# Patient Record
Sex: MALE | Race: ASIAN
[De-identification: names, ages, dates, MRNs, and addresses within clinical notes are randomized per-mention and may not be internally consistent; named-entity substitution may affect disease eponyms.]

---

## 2020-08-12 ENCOUNTER — HOSPITAL ENCOUNTER (INPATIENT)
Dept: HOSPITAL 72 - SDSOVERFLO | Age: 40
LOS: 11 days | Discharge: HOME | DRG: 520 | End: 2020-08-23
Payer: COMMERCIAL

## 2020-08-12 VITALS — HEIGHT: 69 IN | WEIGHT: 135 LBS | BODY MASS INDEX: 19.99 KG/M2

## 2020-08-12 DIAGNOSIS — M48.07: ICD-10-CM

## 2020-08-12 DIAGNOSIS — M51.17: ICD-10-CM

## 2020-08-12 DIAGNOSIS — M48.061: ICD-10-CM

## 2020-08-12 DIAGNOSIS — M51.16: Primary | ICD-10-CM

## 2020-08-12 DIAGNOSIS — D64.9: ICD-10-CM

## 2020-08-12 PROCEDURE — 72020 X-RAY EXAM OF SPINE 1 VIEW: CPT

## 2020-08-12 PROCEDURE — 85025 COMPLETE CBC W/AUTO DIFF WBC: CPT

## 2020-08-12 PROCEDURE — 86901 BLOOD TYPING SEROLOGIC RH(D): CPT

## 2020-08-12 PROCEDURE — 87081 CULTURE SCREEN ONLY: CPT

## 2020-08-12 PROCEDURE — 82962 GLUCOSE BLOOD TEST: CPT

## 2020-08-12 PROCEDURE — 86850 RBC ANTIBODY SCREEN: CPT

## 2020-08-12 PROCEDURE — 82248 BILIRUBIN DIRECT: CPT

## 2020-08-12 PROCEDURE — 94150 VITAL CAPACITY TEST: CPT

## 2020-08-12 PROCEDURE — 85007 BL SMEAR W/DIFF WBC COUNT: CPT

## 2020-08-12 PROCEDURE — 86900 BLOOD TYPING SEROLOGIC ABO: CPT

## 2020-08-12 PROCEDURE — 36415 COLL VENOUS BLD VENIPUNCTURE: CPT

## 2020-08-12 PROCEDURE — 80053 COMPREHEN METABOLIC PANEL: CPT

## 2020-08-12 PROCEDURE — 94003 VENT MGMT INPAT SUBQ DAY: CPT

## 2020-08-12 PROCEDURE — 76000 FLUOROSCOPY <1 HR PHYS/QHP: CPT

## 2020-08-21 VITALS — SYSTOLIC BLOOD PRESSURE: 126 MMHG | DIASTOLIC BLOOD PRESSURE: 78 MMHG

## 2020-08-21 VITALS — DIASTOLIC BLOOD PRESSURE: 82 MMHG | SYSTOLIC BLOOD PRESSURE: 122 MMHG

## 2020-08-21 VITALS — DIASTOLIC BLOOD PRESSURE: 71 MMHG | SYSTOLIC BLOOD PRESSURE: 110 MMHG

## 2020-08-21 VITALS — SYSTOLIC BLOOD PRESSURE: 110 MMHG | DIASTOLIC BLOOD PRESSURE: 67 MMHG

## 2020-08-21 VITALS — SYSTOLIC BLOOD PRESSURE: 120 MMHG | DIASTOLIC BLOOD PRESSURE: 74 MMHG

## 2020-08-21 VITALS — DIASTOLIC BLOOD PRESSURE: 88 MMHG | SYSTOLIC BLOOD PRESSURE: 130 MMHG

## 2020-08-21 VITALS — SYSTOLIC BLOOD PRESSURE: 148 MMHG | DIASTOLIC BLOOD PRESSURE: 52 MMHG

## 2020-08-21 VITALS — SYSTOLIC BLOOD PRESSURE: 125 MMHG | DIASTOLIC BLOOD PRESSURE: 77 MMHG

## 2020-08-21 VITALS — DIASTOLIC BLOOD PRESSURE: 72 MMHG | SYSTOLIC BLOOD PRESSURE: 121 MMHG

## 2020-08-21 VITALS — SYSTOLIC BLOOD PRESSURE: 92 MMHG | DIASTOLIC BLOOD PRESSURE: 56 MMHG

## 2020-08-21 VITALS — DIASTOLIC BLOOD PRESSURE: 69 MMHG | SYSTOLIC BLOOD PRESSURE: 116 MMHG

## 2020-08-21 VITALS — DIASTOLIC BLOOD PRESSURE: 78 MMHG | SYSTOLIC BLOOD PRESSURE: 139 MMHG

## 2020-08-21 VITALS — DIASTOLIC BLOOD PRESSURE: 86 MMHG | SYSTOLIC BLOOD PRESSURE: 164 MMHG

## 2020-08-21 VITALS — SYSTOLIC BLOOD PRESSURE: 118 MMHG | DIASTOLIC BLOOD PRESSURE: 73 MMHG

## 2020-08-21 VITALS — DIASTOLIC BLOOD PRESSURE: 73 MMHG | SYSTOLIC BLOOD PRESSURE: 112 MMHG

## 2020-08-21 VITALS — SYSTOLIC BLOOD PRESSURE: 143 MMHG | DIASTOLIC BLOOD PRESSURE: 92 MMHG

## 2020-08-21 VITALS — SYSTOLIC BLOOD PRESSURE: 132 MMHG | DIASTOLIC BLOOD PRESSURE: 85 MMHG

## 2020-08-21 VITALS — SYSTOLIC BLOOD PRESSURE: 130 MMHG | DIASTOLIC BLOOD PRESSURE: 81 MMHG

## 2020-08-21 VITALS — DIASTOLIC BLOOD PRESSURE: 73 MMHG | SYSTOLIC BLOOD PRESSURE: 121 MMHG

## 2020-08-21 LAB
ADD MANUAL DIFF: YES
ALBUMIN SERPL-MCNC: 3.8 G/DL (ref 3.4–5)
ALBUMIN/GLOB SERPL: 1.6 {RATIO} (ref 1–2.7)
ALP SERPL-CCNC: 46 U/L (ref 46–116)
ALT SERPL-CCNC: 17 U/L (ref 12–78)
ANION GAP SERPL CALC-SCNC: 9 MMOL/L (ref 5–15)
AST SERPL-CCNC: 13 U/L (ref 15–37)
BILIRUB DIRECT SERPL-MCNC: 0.3 MG/DL (ref 0–0.3)
BILIRUB SERPL-MCNC: 1.1 MG/DL (ref 0.2–1)
BUN SERPL-MCNC: 14 MG/DL (ref 7–18)
CALCIUM SERPL-MCNC: 8.4 MG/DL (ref 8.5–10.1)
CHLORIDE SERPL-SCNC: 107 MMOL/L (ref 98–107)
CO2 SERPL-SCNC: 25 MMOL/L (ref 21–32)
CREAT SERPL-MCNC: 1.3 MG/DL (ref 0.55–1.3)
ERYTHROCYTE [DISTWIDTH] IN BLOOD BY AUTOMATED COUNT: 14.2 % (ref 11.6–14.8)
GLOBULIN SER-MCNC: 2.4 G/DL
HCT VFR BLD CALC: 30.8 % (ref 42–52)
HGB BLD-MCNC: 9.4 G/DL (ref 14.2–18)
MCV RBC AUTO: 62 FL (ref 80–99)
PLATELET # BLD: 155 K/UL (ref 150–450)
POTASSIUM SERPL-SCNC: 4 MMOL/L (ref 3.5–5.1)
RBC # BLD AUTO: 4.94 M/UL (ref 4.7–6.1)
SODIUM SERPL-SCNC: 141 MMOL/L (ref 136–145)
WBC # BLD AUTO: 11.7 K/UL (ref 4.8–10.8)

## 2020-08-21 RX ADMIN — DOCUSATE SODIUM SCH MG: 100 CAPSULE, LIQUID FILLED ORAL at 18:05

## 2020-08-21 RX ADMIN — SODIUM CHLORIDE SCH MLS/HR: 0.9 INJECTION INTRAVENOUS at 18:05

## 2020-08-21 RX ADMIN — SODIUM CHLORIDE PRN MG: 0.9 INJECTION INTRAVENOUS at 17:39

## 2020-08-21 RX ADMIN — DIPHENHYDRAMINE HYDROCHLORIDE PRN MG: 50 INJECTION INTRAMUSCULAR; INTRAVENOUS at 22:05

## 2020-08-21 RX ADMIN — SODIUM CHLORIDE AND POTASSIUM CHLORIDE SCH MLS/HR: 9; 1.49 INJECTION, SOLUTION INTRAVENOUS at 18:05

## 2020-08-21 NOTE — ANETHESIA PREOPERATIVE EVAL
Anesthesia Pre-op PMH/ROS


General


Date of Evaluation:  Aug 21, 2020


Time of Evaluation:  09:29


Anesthesiologist:  Markos


ASA Score:  ASA 2


Mallampati Score


Class I : Soft palate, uvula, fauces, pillars visible


Class II: Soft palate, uvula, fauces visible


Class III: Soft palate, base of uvula visible


Class IV: Only hard plate visible


Mallampati Classification:  Class II


Surgeon:  Nathaniel


Diagnosis:  Back Pain


Surgical Procedure:  B Hemilaminotomy, Foraminotomy, L4-5, L5-S1, R 

Microdiscectomy L4-5, L5-S1


Anesthesia History:  none


Family History:  no anesthesia problems


Allergies:  


Coded Allergies:  


     No Known Allergies (Unverified , 20)


Medications:  see eMAR


Patient NPO?:  Yes





Past Medical History


Pulmonary:  Reports: asthma


Hematology/Immune:  Reports: anemia





Anesthesia Pre-op Phys. Exam


Physician Exam





Last Vital Signs








  Date Time  Temp Pulse Resp B/P (MAP) Pulse Ox O2 Delivery O2 Flow Rate FiO2


 


20 08:33      Room Air  


 


20 08:31 97.7 69 18 121/72 (88) 100   








Constitutional:  NAD


Neurologic:  CN 2-12 intact


Cardiovascular:  RRR


Respiratory:  CTA


Gastrointestinal:  S/NT/ND





Airway Exam


Mallampati Score:  Class II


MO:  full


ROM:  full


Teeth:  missing, intact





Anesthesia Pre-op A/P


Risk Assessment & Plan


Assessment:


ASA 2


Plan:


GA, SED, GlideScope


Status Change Before Surgery:  No





Pre-Antibiotics


Dru Grams Ancef IV


Given Within 1 Hr of Incision:  Yes


Time Given:  09:51











David Burrows MD Aug 21, 2020 09:07

## 2020-08-21 NOTE — DIAGNOSTIC IMAGING REPORT
INDICATION:  Pain, intraoperative

 

TECHNIQUE: Intraoperative imaging

 

Fluoroscopy time: 2.9 seconds

Total dose: 0.90214 mGym2

Total number of images: One

 

COMPARISON: None

 

FINDINGS: Intraoperative images document surgical tools projected posterior to the

superior L5 endplate and L5-S1 disc.

 

IMPRESSION: Intraoperative imaging, as described

## 2020-08-21 NOTE — BRIEF OPERATIVE NOTE
Immediate Post Operative Note


Operative Note


Chief Complaint:  back pain and radiculopathy


Pre-op Diagnosis:


L45 and L5S1 herniation


Procedure:


Bilateral hemilaminotomy foraminotomy and right sided microdiscectomy of lumbar 

45 and 5-S1


Post-op Diagnosis:  same as pre-op


Findings:  consistent w/pre-op dx studies


Surgeon:  Nathaniel


Assistant:  Casey


Anesthesiologist:  Italo


Anesthesia:  general


Specimen:  none


Complications:  none


Condition:  stable


Fluids:  IVF


Estimated Blood Loss:  minimal


Drains:  none


Implant(s) used?:  No











Ray rAmstrong MD Aug 21, 2020 07:33

## 2020-08-21 NOTE — NUR
CASE MANAGEMENT: INITIAL REVIEW 



38 YO M PRESENTED TO OUR [facility]OS[facility] FOR SURGERY 



PMHx; ASTHMA 



SI;S/P Bilateral hemilaminotomy foraminotomy and right sided microdiscectomy of lumbar 45 
and 5-S1

VS: T 97.7 HR 69 RR 18 B/P 121/72 SATS 100% ON RA 

LABS: WBC 11.7  CA 8.4 TBILI 1.1 AST 13 



IS:OR MEDS'





** PATIENT ADMITTED TO MED/SURG 8/21/2020 @ 0733****



DCP: HOME 



PLAN OF CARE: 

1. incentive spirometry

2. SCD

3. PT evaluation and therapy

4. Hydration

5. Pain management

6. Monitor LOC

7. discharge once stable with outpatient follow up





Operative Note

Chief Complaint:  back pain and radiculopathy

Pre-op Diagnosis:

L45 and L5S1 herniation

Procedure:

Bilateral hemilaminotomy foraminotomy and right sided microdiscectomy of lumbar 45 and 5-S1

Post-op Diagnosis:  same as pre-op

## 2020-08-21 NOTE — 48 HOUR POST ANESTHESIA EVAL
Post Anesthesia Evaluation


Procedure:  B Hemilaminotomy, Foraminotomy, L4-5, L5-S1, R Microdiscectomy L4-5

, L5-S1


Date of Evaluation:  Aug 21, 2020


Time of Evaluation:  17:23


Blood Pressure Systolic:  132


0:  74


Pulse Rate:  71


Respiratory Rate:  18


Temperature (Fahrenheit):  98.6


O2 Sat by Pulse Oximetry:  100


Airway:  patent


Nausea:  No


Vomiting:  No


Pain Intensity:  2


Hydration Status:  adequate


Cardiopulmonary Status:


Stable


Mental Status/LOC:  patient returned to baseline


Follow-up Care/Observations:


0


Post-Anesthesia Complications:


0











David Burrows MD Aug 21, 2020 09:34

## 2020-08-21 NOTE — GENERAL PROGRESS NOTE
Assessment/Plan


Assessment/Plan:


L45 and L5S1 herniation


Bilateral hemilaminotomy foraminotomy and right sided microdiscectomy of lumbar 

45 and 5-S1


paradoxical anesthesia reaction with toxic met encephalopathy, resolved





PLAN


1. incentive spirometry


2. SCD


3. PT evaluation and therapy


4. Hydration


5. Pain management


6. Monitor LOC


7. discharge once stable with outpatient follow up





d/w Dr. Armstrong





Subjective


Allergies:  


Coded Allergies:  


     No Known Allergies (Unverified , 8/20/20)


Subjective


asked to see in recovery 


was altered


vitals initially tachy 


improved when seen





poor response to pain


on follow up - now back to normal baseline





Objective





Last 24 Hour Vital Signs








  Date Time  Temp Pulse Resp B/P (MAP) Pulse Ox O2 Delivery O2 Flow Rate FiO2


 


8/21/20 14:55  71 18  100   


 


8/21/20 14:54  134 16  100   


 


8/21/20 14:50 99.3 128 28 164/84 100 Simple Mask 6 


 


8/21/20 08:33      Room Air  


 


8/21/20 08:31 97.7 69 18 121/72 (88) 100   








Laboratory Tests


8/21/20 15:50: POC Whole Blood Glucose 183H


8/21/20 16:05: 


White Blood Count 11.7H, Red Blood Count 4.94, Hemoglobin 9.4L, Hematocrit 30.8L

, Mean Corpuscular Volume 62L, Mean Corpuscular Hemoglobin 19.1L, Mean 

Corpuscular Hemoglobin Concent 30.6L, Red Cell Distribution Width 14.2, 

Platelet Count 155, Mean Platelet Volume 9.3, Neutrophils (%) (Auto) , 

Lymphocytes (%) (Auto) , Monocytes (%) (Auto) , Eosinophils (%) (Auto) , 

Basophils (%) (Auto) , Differential Total Cells Counted 100, Neutrophils % (

Manual) 88H, Lymphocytes % (Manual) 3L, Monocytes % (Manual) 8, Eosinophils % (

Manual) 1, Basophils % (Manual) 0, Band Neutrophils 0, Platelet Estimate 

DecreasedL, Platelet Morphology Normal, Polychromasia 1+, Hypochromasia 1+, 

Anisocytosis 1+, Sodium Level 141, Potassium Level 4.0, Chloride Level 107, 

Carbon Dioxide Level 25, Anion Gap 9, Blood Urea Nitrogen 14, Creatinine 1.3, 

Estimat Glomerular Filtration Rate > 60, Glucose Level 182H, Calcium Level 8.4L

, Total Bilirubin 1.1H, Direct Bilirubin 0.3, Aspartate Amino Transf (AST/SGOT) 

13L, Alanine Aminotransferase (ALT/SGPT) 17, Alkaline Phosphatase 46, Total 

Protein 6.2L, Albumin 3.8, Globulin 2.4, Albumin/Globulin Ratio 1.6


Height (Feet):  5


Height (Inches):  9.00


Weight (Pounds):  135


Objective


WDWN


NAD


clear breath sounds bilaterally without rhonchi or wheeze


[id number] without MRG


NABS nontender no HSM


no CCE


nonfocal











Foster Delacruz MD Aug 21, 2020 17:27

## 2020-08-21 NOTE — NUR
NURSE HAND-OFF: 



Important Events on Shift:[s/p surgery]

Patient Status: stable

Diet: regular



Pending Orders: n/a

Pending Results/Labs:n/a

Pending MD notification:n/a



Latest Vital Signs: Temperature 98.1 , Pulse 89 , B/P 118 /73 , Respiratory Rate 18 , O2 SAT 
95 , Room Air, O2 Flow Rate  .  

Vital Sign Comment: n/a



Latest Mae Fall Score: 35  

Fall Risk: Medium Risk 

Safety Measures: Call light Within Reach, Side Rails Side Rails x1, Bed position Low and 
Locked.

Fall Precautions: 

Patient Fall Education



Report given to Al RN.

## 2020-08-21 NOTE — NUR
NURSE NOTES:

Patient arrived on unit via hospital bed. Stable. AOx2. Patient needed to be reminded of 
where he is and what day it is. Patient denies pain or SOB. Breathing is even and unlabored. 
VSS. Patient oriented to room, call light, and unit. Patient instructed to use call light 
for assistance, verbalized understanding. Patient is in bed in locked and lowest position 
with call light within reach. All safety measures provided. SCD on jenni legs. IV flushed and 
patent. Surgical dressing c/d/i. Ice pack in place. All needs met at this time. Will 
continue to monitor.

## 2020-08-21 NOTE — GENERAL SURGERY PROGRESS NOTE
General Surgery-Progress Note


Subjective


Day of Surgery:  8/21/2020


Reason for Consult


Postoperative State


Procedure Performed


Bilateral hemilaminotomy foraminotomy and right sided microdiscectomy of lumbar 

45 and 5-S1


Chief Complaint:  Non responsive to commands.


Additional Comments


Patient is not responding to all commands as he is awakening from anesthesia, 

yet moves all extremities





Objective





Last 24 Hour Vital Signs








  Date Time  Temp Pulse Resp B/P (MAP) Pulse Ox O2 Delivery O2 Flow Rate FiO2


 


8/21/20 14:55  71 18  100   


 


8/21/20 14:54  134 16  100   


 


8/21/20 14:50 99.3 128 28 164/84 100 Simple Mask 6 


 


8/21/20 08:33      Room Air  


 


8/21/20 08:31 97.7 69 18 121/72 (88) 100   








Dressing:  dry


Wound:  dry


Drains:  none


Cardiovascular:  RSR


Respiratory:  clear


Abdomen:  soft, non-tender


Extremities:  no edema





Laboratory Tests








Test


  8/21/20


15:50


 


POC Whole Blood Glucose


  183 MG/DL


()  H








Imaging


MRI Brain, Ct Head





Assessment


Post-op Diagnosis


Status post hemilaminotomy microdiscectomy L45 L5S1





Plan


Additional Comments


Workup in the PACU for his current state


I was notified at 3:27PM that Mr Aguirre was having difficulty waking from the 

surgical anesthesia


I was in the hospital and was at his bedside within moments


I immediately called for Dr. Burrows and Dr Rose to help evaluate our shared 

patient


I ordered a Ct Head STAT , MRI Brain, Neurology consult with Dr Aston Aguiar


CBC , Chem12


Transfer to ICU for monitoring in the interim











Ray Armstrong MD Aug 21, 2020 16:09

## 2020-08-21 NOTE — PRE-PROCEDURE NOTE/ATTESTATION
Pre-Procedure Note/Attestation


Complete Prior to Procedure


Planned Procedure:  bilateral


Procedure Narrative:


Bilateral hemilaminotomy foraminotomy and right sided microdiscectomy of lumbar 

45 and 5-S1





Indications for Procedure


Pre-Operative Diagnosis:


L45 and L5S1 herniation





Attestation


I attest that I discussed the nature of the procedure; its benefits; risks and 

complications; and alternatives (and the risks and benefits of such alternatives

), prior to the procedure, with the patient (or the patient's legal 

representative).





I attest that, if there was a reasonable possibility of needing a blood 

transfusion, the patient (or the patient's legal representative) was given the 

[place] [employer] standardized written summary, pursuant 

to the [place] Blood Safety Act ([place] Health and Safety Code # 1645, as 

amended).





I attest that I re-evaluated the patient just prior to the surgery and that 

there has been no change in the patient's H&P, except as documented below:











Ray Armstrong MD Aug 21, 2020 07:32

## 2020-08-21 NOTE — NUR
NURSE NOTES:

Received awake flat in bed, alert and oriented, verbally responsive. No sob noted, breathing 
even and unlabored, with complaints of pain, 6/10, was just given pain meds. With bed in 
it's lowest position, alarmed and locked. With post op site dressing dry and intact. Will 
continue to monitor.

## 2020-08-21 NOTE — IMMEDIATE POST-OP EVALUATION
Immediate Post-Op Evalulation


Immediate Post-Op Evalulation


Procedure:  B Hemilaminotomy, Foraminotomy, L4-5, L5-S1, R Microdiscectomy L4-5

, L5-S1


Date of Evaluation:  Aug 21, 2020


Time of Evaluation:  15:05


IV Fluids:  1700 LR


Blood Products:  0


Estimated Blood Loss:  100


Urinary Output:  1100


Blood Pressure Systolic:  144


Blood Pressure Diastolic:  88


Pulse Rate:  134


Respiratory Rate:  16


O2 Sat by Pulse Oximetry:  100


Temperature (Fahrenheit):  99.3


Pain Score (1-10):  2


Nausea:  No


Vomiting:  No


Complications


0


Patient Status:  awake, reacts, patent, extubated, none


Hydration Status:  adequate


Dru Grams Ancef IV


Given Within 1 Hr of Incision:  Yes


Time Given:  09:51











David Burrows MD Aug 21, 2020 09:33

## 2020-08-21 NOTE — GENERAL SURGERY PROGRESS NOTE
General Surgery-Progress Note


Subjective


Day of Surgery:  8/21/20


Reason for Consult


Patient awoke from surgery 430pm , he is responsive to person place and time, 

able to answer all questions normally


Procedure Performed


Bilateral hemilaminotomy foraminotomy and right sided microdiscectomy of lumbar 

45 and 5-S1


Chief Complaint:  Patient was nonresponsive postoperative until he awoke from 

anesthesia 430-


Symptoms:  improved


Additional Comments


I was at the bedside about to place a lockett when he awoke from surgery





Objective





Last 24 Hour Vital Signs








  Date Time  Temp Pulse Resp B/P (MAP) Pulse Ox O2 Delivery O2 Flow Rate FiO2


 


8/21/20 14:55  71 18  100   


 


8/21/20 14:54  134 16  100   


 


8/21/20 14:50 99.3 128 28 164/84 100 Simple Mask 6 


 


8/21/20 08:33      Room Air  


 


8/21/20 08:31 97.7 69 18 121/72 (88) 100   








Dressing:  dry


Wound:  clean, intact


Drains:  none


Cardiovascular:  RSR


Respiratory:  clear


Abdomen:  soft


Extremities:  no edema





Laboratory Tests








Test


  8/21/20


15:50 8/21/20


16:05


 


POC Whole Blood Glucose


  183 MG/DL


()  H 


 


 


White Blood Count


  


  11.7 K/UL


(4.8-10.8)  H


 


Red Blood Count


  


  4.94 M/UL


(4.70-6.10)


 


Hemoglobin


  


  9.4 G/DL


(14.2-18.0)  L


 


Hematocrit


  


  30.8 %


(42.0-52.0)  L


 


Mean Corpuscular Volume


  


  62 FL (80-99)


L


 


Mean Corpuscular Hemoglobin


  


  19.1 PG


(27.0-31.0)  L


 


Mean Corpuscular Hemoglobin


Concent 


  30.6 G/DL


(32.0-36.0)  L


 


Red Cell Distribution Width


  


  14.2 %


(11.6-14.8)


 


Platelet Count


  


  155 K/UL


(150-450)


 


Mean Platelet Volume


  


  9.3 FL


(6.5-10.1)


 


Neutrophils (%) (Auto)


  


  % (45.0-75.0)


 


 


Lymphocytes (%) (Auto)


  


  % (20.0-45.0)


 


 


Monocytes (%) (Auto)   % (1.0-10.0)  


 


Eosinophils (%) (Auto)   % (0.0-3.0)  


 


Basophils (%) (Auto)   % (0.0-2.0)  


 


Neutrophils % (Manual)  Pending  


 


Lymphocytes % (Manual)  Pending  


 


Platelet Estimate  Pending  


 


Platelet Morphology  Pending  


 


Sodium Level  Pending  


 


Potassium Level  Pending  


 


Chloride Level  Pending  


 


Carbon Dioxide Level  Pending  


 


Blood Urea Nitrogen  Pending  


 


Creatinine  Pending  


 


Estimat Glomerular Filtration


Rate 


  Pending  


 


 


Glucose Level  Pending  


 


Calcium Level  Pending  


 


Total Bilirubin  Pending  


 


Aspartate Amino Transf


(AST/SGOT) 


  Pending  


 


 


Alanine Aminotransferase


(ALT/SGPT) 


  Pending  


 


 


Alkaline Phosphatase  Pending  


 


Total Protein  Pending  


 


Albumin  Pending  


 


Globulin  Pending  











Assessment


Post-op Diagnosis


Status post hemilaminotomy microdiscectomy L45 L5S1





Plan


Additional Comments


Patient awoke and is able to answer all questions to person place and time


he was able to answer who the president was, the year , the month the city and 

his function here


He is now completely responsive and as a result we can hold on cT MRI and 

neurology consultation


We will follow him closely











Ray Armstrong MD Aug 21, 2020 16:33

## 2020-08-22 VITALS — DIASTOLIC BLOOD PRESSURE: 70 MMHG | SYSTOLIC BLOOD PRESSURE: 119 MMHG

## 2020-08-22 VITALS — DIASTOLIC BLOOD PRESSURE: 53 MMHG | SYSTOLIC BLOOD PRESSURE: 96 MMHG

## 2020-08-22 VITALS — SYSTOLIC BLOOD PRESSURE: 127 MMHG | DIASTOLIC BLOOD PRESSURE: 73 MMHG

## 2020-08-22 VITALS — DIASTOLIC BLOOD PRESSURE: 56 MMHG | SYSTOLIC BLOOD PRESSURE: 105 MMHG

## 2020-08-22 VITALS — SYSTOLIC BLOOD PRESSURE: 100 MMHG | DIASTOLIC BLOOD PRESSURE: 66 MMHG

## 2020-08-22 VITALS — DIASTOLIC BLOOD PRESSURE: 65 MMHG | SYSTOLIC BLOOD PRESSURE: 123 MMHG

## 2020-08-22 VITALS — SYSTOLIC BLOOD PRESSURE: 99 MMHG | DIASTOLIC BLOOD PRESSURE: 51 MMHG

## 2020-08-22 RX ADMIN — DIPHENHYDRAMINE HYDROCHLORIDE PRN MG: 50 INJECTION INTRAMUSCULAR; INTRAVENOUS at 10:04

## 2020-08-22 RX ADMIN — HYDROCODONE BITARTRATE AND ACETAMINOPHEN PRN TAB: 7.5; 325 TABLET ORAL at 04:04

## 2020-08-22 RX ADMIN — SODIUM CHLORIDE PRN MG: 0.9 INJECTION INTRAVENOUS at 12:12

## 2020-08-22 RX ADMIN — SODIUM CHLORIDE SCH MLS/HR: 0.9 INJECTION INTRAVENOUS at 02:26

## 2020-08-22 RX ADMIN — DOCUSATE SODIUM SCH MG: 100 CAPSULE, LIQUID FILLED ORAL at 08:48

## 2020-08-22 RX ADMIN — SODIUM CHLORIDE AND POTASSIUM CHLORIDE SCH MLS/HR: 9; 1.49 INJECTION, SOLUTION INTRAVENOUS at 04:00

## 2020-08-22 RX ADMIN — HYDROCODONE BITARTRATE AND ACETAMINOPHEN PRN TAB: 7.5; 325 TABLET ORAL at 17:17

## 2020-08-22 RX ADMIN — SODIUM CHLORIDE AND POTASSIUM CHLORIDE SCH MLS/HR: 9; 1.49 INJECTION, SOLUTION INTRAVENOUS at 23:08

## 2020-08-22 RX ADMIN — SODIUM CHLORIDE PRN MG: 0.9 INJECTION INTRAVENOUS at 00:35

## 2020-08-22 RX ADMIN — SODIUM CHLORIDE SCH MLS/HR: 0.9 INJECTION INTRAVENOUS at 09:00

## 2020-08-22 RX ADMIN — HYDROCODONE BITARTRATE AND ACETAMINOPHEN PRN TAB: 7.5; 325 TABLET ORAL at 23:08

## 2020-08-22 RX ADMIN — SODIUM CHLORIDE AND POTASSIUM CHLORIDE SCH MLS/HR: 9; 1.49 INJECTION, SOLUTION INTRAVENOUS at 15:45

## 2020-08-22 RX ADMIN — DOCUSATE SODIUM SCH MG: 100 CAPSULE, LIQUID FILLED ORAL at 17:17

## 2020-08-22 NOTE — OPERATIVE NOTE - DICTATED
DATE OF OPERATION:  08/21/2020

SURGEON:  Ray Armstrong MD



ASSISTANT:  CASS Perry.



ANESTHESIA:  General endotracheal anesthesia.



PREOPERATIVE DIAGNOSES:

1. Intractable back pain.

2. Intractable leg pain.

3. Worsening radiculopathy.

4. Weakness.

5. Herniated nucleus pulposus, L4-L5, L5-S1 herniation.

6. Neural foraminal stenosis, L4-L5, L5-S1.



POSTOPERATIVE DIAGNOSES:

1. Intractable back pain.

2. Intractable leg pain.

3. Worsening radiculopathy.

4. Weakness.

5. Herniated nucleus pulposus, L4-L5, L5-S1 herniation.

6. Neural foraminal stenosis, L4-L5, L5-S1.



PROCEDURES PERFORMED:

1. Right-sided L4-L5, L5-S1 microdiscectomy.

2. L4-L5, L5-S1 hemilaminotomy, foraminotomy and medial facetectomy.

3. L4-L5, L5-S1 neural foraminotomy through a transpedicular

intraforaminal approach.

4. Use of intraoperative microscope.

5. Supervision and interpretation of intraoperative fluoroscopy.

6. Supervision and interpretation of somatosensory-evoked potential and

free running EMG monitoring.

7. Bilateral hemilaminotomy, foraminotomy L5-S1 and L4-L5.

8. Duraplasty L4-L5.



EBL:  Less than 100 mL.



COMPLICATIONS:  None.



INDICATIONS FOR THE PROCEDURE:  The patient presents for intractable back

pain and radiculopathy.  The patient tried and failed a prolonged course

of conservative management, including but not limited to chiropractic

therapy, physical therapy, nonsteroidal anti-inflammatory drugs,

medication, ice packs as well as epidural injection. Despite these

therapies, the patient still developed recalcitrant pain and elected for

definitive management in the form of right-sided L4-L5, L5-S1

microdiscectomy, L4-L5, L5-S1 hemilaminotomy, foraminotomy and medial

facetectomy, L4-L5, L5-S1 neural foraminotomy through a transpedicular

intraforaminal approach



CONSENT:  We had a long discussion with the patient regarding definitive

surgical treatment options.  The patient's MRI demonstrated herniated

nucleus pulposus, L4-L5, L5-S1 herniation, neural foraminal stenosis,

L4-L5, L5-S1 and as a result, I felt the patient would benefit from the

discectomy as well as neural foraminotomy at this level. We had a long

discussion with the patient regarding the risks, alternatives, and

benefits of surgery.  Our description of the risks included a discussion

in person as well as a signed consent which detailed all pertinent risks

and the procedure itself. Briefly, our discussion included but was not

limited to infection, bleeding, pseudarthrosis, spinal cord injury,

neurovascular injury, dural tear, CSF leak, neuropathy, paralysis,

permanent weakness/drop foot, paresthesias blindness, palsy and weakness.

The patient understood there may be a need for revision surgery or

additional procedures. Approach related complications including dysphonia,

dysphagia, blindness, permanent vocal cord and neural injury, hematoma,

swallowing and breathing difficulty. Medical complications including

liver, kidney, shock, and cardiopulmonary failure. Anesthesia

complications including death, swelling. Damage to the musculature, larynx

(voice injury or loss),esophagus (throat), trachea, blood vessels and

muscles (muscular sprain) and lungs (pneumothorax) during this surgical

procedure. Injury to deeper structures may be temporary or permanent. The

patient understood these and elected to proceed. A written and verbal

consent was given.



We discussed the pros and cons of all the alternatives.  We discussed

the uncertainties associated with the decision. Afterwards I assessed the

patients understanding and explored their preferences. All questions were

answered and no guarantees were given. Medical clearance was obtained

prior to surgery



INTRAOPERATIVE FINDINGS:  L4-L5 level:  There was a larger than anticipated

disc herniation at L4-L5 on the right side encroaching the right neural

foramina.  This was larger than anticipated when viewed on the MRI.  This

disc was encroaching on neural foramina on the right side.  A

hemilaminotomy was performed and noticed a bulging at the neural element

posteriorly.  The neural element was carefully mobilized and noticed a

protrusion posteriorly.  The neural element was due to a lateralized disc

herniation of herniated nucleus pulposus tissue.  This was probed with a

Microsect-1B curette and found to be the source of a tear in annular

fibrosis along the right aspect of the TLL.  This tear was approximately

inline with the annular fiber vertically almost 10 degrees cephalad to

caudad.  Through this ________ tissue encroaching on the neural foramina

as described on the right side.  This tissue was carefully dissected with

a combination of a Moise pituitary, arthroscopic pituitaries, and 1.5 and

2 mm narrow pituitaries until a complete microdiskectomy was performed.

Afterwards, I noticed there was no longer any protrusion of posterior

directed migration of the neural elements at this level.



At L4-L5, I noticed a calcification on the posterior margin of the

ligamentum flavum onto the dural tissue itself as a part of the

microdiskectomy and the decompression, the ligamentum flavum was resected

and upon removal of the bony island _______ attached to the dural tissue,

the decision was made to carefully peel off the dural tissue and for not

leaving it in its place.  As a result, this necessitated a duraplasty for

a 1 to 2 mm defect.  This was performed with a TF-4 Nurolon suture.  This

allowed for a wider watertight closure, which was afterwards supplemented

with DuraGen and Tisseel.



L5-S1:  At L5-S1 after the bilateral hemilaminotomy and foraminotomies were

performed, I noted a slight posterior migration on the neural element.

Once these were carefully mobilized using a Penfield 4 and narrow nerve

root retractor, I noticed the source to be a posterior disc herniation,

which was visualized through a nearly vertical tear in the posterior

longitudinal ligament, which was along the right neural foramina

approximately one-third laterally.  This nuclear tissue was carefully

mobilized with a Microsect-1B and was carefully resected with a

combination of arthroscopic pituitaries, narrow pituitaries 1.5 and 2 mm,

and a Moise micropituitary.  There were no bony elements noted at

L5-S1.



I should note the disc at both L4-L5 and L5-S1 was soft and appeared to

be in acute traumatic disc herniation.  There was nothing degenerative

noted about the disc.  The disc was not crumbled or desiccated or collapse

in disc height.



DESCRIPTION OF PROCEDURE:  Under the benefit of general endotracheal

anesthesia and with the assistance of the entire operative team, the

patient was moved from the r[place] onto the operative table in the prone

position on a [place] frame. The head was secured and positioned

appropriately. Bilateral arms were secured with Gel pads and foam and all

bony prominences were padded. The bilateral lower extremity SCD and DAE

hose were placed for DVT prophylaxis. A surgical timeout was called which

corroborated our planned procedure. Preoperative Antibiotics were

administered within 30 minutes of the incision for prophylaxis. Decadron

was given for preoperative steroids.



Using lateral radiography, the operative levels were delineated. An

incision was marked based on our interpretation of lateral radiography and

afterwards the body was prepped and draped in the usual sterile manner.

The family was notified that we were ready to commence surgery and were

called in the waiting room hourly for updates.



An incision was based on our lateral fluoroscopic image to center the

incision at the L5-S1 interspace. The wound was prepped and draped in the

usual sterile fashion. Using a scalpel a midline incision was taken down

through the skin and subcutaneous tissues until the overlying hemilamina

of L4-L5, L5-S1 were visualized.  Next, using meticulous hemostasis,

hemilaminotomies were dissected and retractors were placed.  Using a

Atzip dental, we confirmed placement at the L4-L5, L5-S1 interspace. We

next turned our attention to our decompression.



A standard hemilaminotomy foraminotomy medial facetectomy was performed

at each level in standard fashion using a Midas-Agustín type AM8 drill bit,

straight and angled curettage, and Kerrison 4 rongeurs until the lateral

thecal sac margin and traversing nerve root was visualized. All remainders

of the ligamentum flavum and lateral bony margins were resected in total

with angled curettage and Kerrison 4 rongeurs until the lateral thecal sac

margin and traversing nerve root was visualized and decompressed.



We next turned our attention toward our L4-L5, L5-S1 microdiscectomy on

the right side. A Penfield 4 was used to gently mobilize the thecal sac

medially and this was held retracted with a bayonetted nerve root

retractor. It was at this point that we noted a large broad-based disc

protrusion with encroachment dorsally on the thecal sac neural foraminal

contents. A bayonet and nerve root retractor was then placed carefully to

retract the thecal sac and a discectomy was performed using a combination

of a long handled 15 blade scalpel, downgoing and straight pituitaries and

downgoing curettage. Afterward the disc space was irrigated twice with 20

mL of antibiotic-impregnated saline. All loose and free-floating disc

fragments were carefully resected with a narrow pituitary.



Having been satisfied with our decompression after our discectomy of all

neural elements, we next turned our attention to our neural

foraminoplasty/foraminotomy. This was performed through a transpedicular

intraforaminal approach using an access probe followed by a neuro check

device, which confirmed ventral placement of our nerve root. Once we

confirmed we were safe, we next turned our attention towards placement of

our size 10 file under direct microscopic visualization and under lateral

fluoroscopy. Using pre and post reciprocation imaging, we were able to

visualize our direct decompression given the reciprocation allowed for

re-creation of the neural foraminal arch at L4-L5, L5-S1.  Afterwards,

hemostasis was obtained with 60 mL of antibiotic-impregnated saline

followed by FloSeal and Gelfoam.



After sponge and needle count were found to be correct, we next turned

our attention to closure. Closure consisted of 1-0 Vicryl in standard

interrupted fashion. Zosyn was placed deep to the fascia and superficial

to the fascia for antibiotic prophylaxis. Skin closure was performed with

2-0 Vicryl in interrupted fashion followed by a running Monocryl for the

skin. Final dressings consisted of Dermabond for the superficial skin,

Telfa and Tegaderm.



The patient tolerated the procedure well. The patient was extubated

after the conclusion of surgery without incident.  We discussed the

findings of the surgery with the family upon completion of the case.  At

this point the patient will be transferred to the spine floor for further

observation.









  ______________________________________________

  Ray Armstrong M.D.





DR:  ARIELLA

D:  08/21/2020 22:52

T:  08/22/2020 00:56

JOB#:  [id number]

CC:

## 2020-08-22 NOTE — NUR
NURSE HAND-OFF: 



Important Events on Shift:

Patient Status: stable

Diet: Regular



Pending Orders: 

Pending Results/Labs:

Pending MD notification:



Latest Vital Signs: Temperature 98.0 , Pulse 66 , B/P 100 /66 , Respiratory Rate 18 , O2 SAT 
98 , Room Air, O2 Flow Rate  .  

Vital Sign Comment: 



Latest Mae Fall Score: 35  

Fall Risk: Medium Risk 

Safety Measures: Call light Within Reach, Bed Alarm Zone 1, Side Rails Side Rails x2, Bed 
position Low and Locked.

Fall Precautions: 

Yellow Socks

Yellow Gown

Door Sign

Patient Fall Education



Report given to .

## 2020-08-22 NOTE — GENERAL PROGRESS NOTE
Assessment/Plan


Assessment/Plan:


L45 and L5S1 herniation


Bilateral hemilaminotomy foraminotomy and right sided microdiscectomy of lumbar 

45 and 5-S1


paradoxical anesthesia reaction with toxic met encephalopathy, resolved





PLAN


1. incentive spirometry


2. SCD


3. PT evaluation and therapy


4. Hydration


5. Pain management


6. Monitor LOC


7. discharge pending surgical and PT clearance





Subjective


Allergies:  


Coded Allergies:  


     No Known Allergies (Unverified , 8/20/20)


Subjective


doing well


at baseline


no focal findings





Objective





Last 24 Hour Vital Signs








  Date Time  Temp Pulse Resp B/P (MAP) Pulse Ox O2 Delivery O2 Flow Rate FiO2


 


8/22/20 04:34 97.8       


 


8/22/20 04:00 98.0 75 19 123/65 (84) 97   


 


8/22/20 02:00 97.1 78 19 119/70 (86) 99   


 


8/22/20 00:00 97.0 75 18 127/73 (91) 96   


 


8/21/20 22:40 97.5 81 19 112/73 (86) 100   


 


8/21/20 22:35 97.5       


 


8/21/20 21:40 97.6 83 19 110/71 (84) 99   


 


8/21/20 20:40 97.9 80 18 116/69 (85) 100   


 


8/21/20 20:11 98.1       


 


8/21/20 20:00      Room Air  


 


8/21/20 19:44  89  118/73 (88)    


 


8/21/20 18:10 98.1 93 18 92/56 (68) 95   


 


8/21/20 17:40 98.1 95 18 110/67 (81) 95   


 


8/21/20 17:10 98.1 97 20 125/77 (93) 96   


 


8/21/20 16:50 98.0 92 22 120/74 98 Room Air  


 


8/21/20 16:35  95 17 121/73 97 Room Air  


 


8/21/20 16:20  101 38 126/78 98 Room Air  


 


8/21/20 16:10  102 28 122/82 100 Room Air  


 


8/21/20 15:50  97 22 130/88 100 Simple Mask 6 


 


8/21/20 15:35  98 22 148/52 99 Simple Mask 6 


 


8/21/20 15:20  100 22 139/78 100 Simple Mask 6 


 


8/21/20 15:10  100 22 130/81 100 Simple Mask 6 


 


8/21/20 15:00  120 25 143/92 100 Simple Mask 6 


 


8/21/20 14:55  121 29 132/85 100 Simple Mask 6 


 


8/21/20 14:55  71 18  100   


 


8/21/20 14:54  134 16  100   


 


8/21/20 14:50 99.3 128 28 164/84 100 Simple Mask 6 


 


8/21/20 14:50  126 18 157/86 100 Simple Mask 6 

















Intake and Output  


 


 8/21/20 8/22/20





 19:00 07:00


 


Intake Total 2300 ml 955 ml


 


Output Total 1200 ml 


 


Balance 1100 ml 955 ml


 


  


 


Intake Oral 0 ml 


 


IV Total 2300 ml 955 ml


 


Output Urine Total 1100 ml 


 


Estimated Blood Loss 100 ml 


 


# Voids 1 








Laboratory Tests


8/21/20 15:50: POC Whole Blood Glucose 183H


8/21/20 16:05: 


White Blood Count 11.7H, Red Blood Count 4.94, Hemoglobin 9.4L, Hematocrit 30.8L

, Mean Corpuscular Volume 62L, Mean Corpuscular Hemoglobin 19.1L, Mean 

Corpuscular Hemoglobin Concent 30.6L, Red Cell Distribution Width 14.2, 

Platelet Count 155, Mean Platelet Volume 9.3, Neutrophils (%) (Auto) , 

Lymphocytes (%) (Auto) , Monocytes (%) (Auto) , Eosinophils (%) (Auto) , 

Basophils (%) (Auto) , Differential Total Cells Counted 100, Neutrophils % (

Manual) 88H, Lymphocytes % (Manual) 3L, Monocytes % (Manual) 8, Eosinophils % (

Manual) 1, Basophils % (Manual) 0, Band Neutrophils 0, Platelet Estimate 

DecreasedL, Platelet Morphology Normal, Polychromasia 1+, Hypochromasia 1+, 

Anisocytosis 1+, Sodium Level 141, Potassium Level 4.0, Chloride Level 107, 

Carbon Dioxide Level 25, Anion Gap 9, Blood Urea Nitrogen 14, Creatinine 1.3, 

Estimat Glomerular Filtration Rate > 60, Glucose Level 182H, Calcium Level 8.4L

, Total Bilirubin 1.1H, Direct Bilirubin 0.3, Aspartate Amino Transf (AST/SGOT) 

13L, Alanine Aminotransferase (ALT/SGPT) 17, Alkaline Phosphatase 46, Total 

Protein 6.2L, Albumin 3.8, Globulin 2.4, Albumin/Globulin Ratio 1.6


Height (Feet):  5


Height (Inches):  9.00


Weight (Pounds):  135


Objective


WDWN


NAD


clear breath sounds bilaterally without rhonchi or wheeze


[id number] without MRG


NABS nontender no HSM


no CCE


nonfocal


fully alert and oriented











Foster Delacruz MD Aug 22, 2020 08:54

## 2020-08-22 NOTE — NUR
CASE MANAGEMENT:REVIEW



8/22/20

SI: POD #1

97.5   74  18  99/51  97% ON RA



IS: IVF+KCL@100/HR

IV ANCEF Q8HRS

COLACE PO BID

IV REGLAN Q6HRS PRN

IV DILAUDID Q2HRS PRN

NORCO PO Q3HRS PRN

**: MED/SURG STATUS 3 EAST

DCP; FROM HOME

## 2020-08-22 NOTE — NUR
NURSE NOTES:

Received report from Alexandra RETANA.pt awake A/Ox4, breaths regular and  unlabored on RA,no s/s 
of distress , reports 5/10 pain but does not want pain medication at this time.  IVF running 
as ordered on the R hand 22 G, patent asymptomatic , bed in low locked position, side rails 
Up X2,  Call light within reach, will continue to monitor

## 2020-08-22 NOTE — NUR
Pt. was continuously monitored. With episodes of nausea 2x, prn meds given and noted 
effective. Had urinated 3x to a yellowish colored urine, free of sediments with a total of 
1300 cc. Complained of post op site pain, non radiating to any part of his body, prn meds 
given and noted with relief as per verbalized by patient. Slept @ short intervals. Log 
rolling done at all times. On continued ivatb therapy, no a/r noted. Pt. had some ice chips 
and tried to eat but becomes nauseated twice. All calls answered promptly. Remained alert 
and oriented x4 @ all times. No episodes of confusion or disorientation noted. Able to do 
his Incentive Spirometry when awake as witnessed. On continued scd therapy at all times. 
Will continue to monitor.

## 2020-08-22 NOTE — NUR
PT Note

PT severino completed, treatment initiated. Patient c/o 3/10 pain on his lumbar surgical 
incisions. Patient needs physical therapy to instruct/train on proper body mechanics/log 
rolling techniques and safety precautions for safe and independent functional mobility and 
gait to enable him to return to [facility]. 










-------------------------------------------------------------------------------

Addendum: 08/22/20 at 1416 by SEBASTIEN PHAN PT

-------------------------------------------------------------------------------

Amended: Links added.

## 2020-08-22 NOTE — NUR
NURSE NOTES:

Received report from Al RN, rounds made , pt awake A/Ox4, breaths regular unlabored on RA,no 
s/s of distress , pt remains stable s/p surgery , denies any pain at this time , pt able to 
wiggle toes, with strong bilateral pedal pulses , pt has bilateral SCD on , IVF on the R 
hand 22 G, patent asymptomatic , bed in low locked position, side rails Up X2, belongings 
and call light within reach, will continue to monitor

## 2020-08-23 VITALS — DIASTOLIC BLOOD PRESSURE: 58 MMHG | SYSTOLIC BLOOD PRESSURE: 100 MMHG

## 2020-08-23 VITALS — DIASTOLIC BLOOD PRESSURE: 61 MMHG | SYSTOLIC BLOOD PRESSURE: 103 MMHG

## 2020-08-23 VITALS — SYSTOLIC BLOOD PRESSURE: 117 MMHG | DIASTOLIC BLOOD PRESSURE: 67 MMHG

## 2020-08-23 VITALS — DIASTOLIC BLOOD PRESSURE: 62 MMHG | SYSTOLIC BLOOD PRESSURE: 111 MMHG

## 2020-08-23 RX ADMIN — HYDROCODONE BITARTRATE AND ACETAMINOPHEN PRN TAB: 7.5; 325 TABLET ORAL at 07:47

## 2020-08-23 RX ADMIN — HYDROCODONE BITARTRATE AND ACETAMINOPHEN PRN TAB: 7.5; 325 TABLET ORAL at 13:32

## 2020-08-23 RX ADMIN — DOCUSATE SODIUM SCH MG: 100 CAPSULE, LIQUID FILLED ORAL at 09:42

## 2020-08-23 RX ADMIN — SODIUM CHLORIDE AND POTASSIUM CHLORIDE SCH MLS/HR: 9; 1.49 INJECTION, SOLUTION INTRAVENOUS at 09:42

## 2020-08-23 NOTE — NUR
NURSE NOTES:

Patient discharged with family member in stable condition. Discharge instruction given to 
patient and verbalized understanding. Discharge prescription and belonging given to patient. 
IV an ID removed. Instructed to follow up with MD and verbalized understanding. Brought down 
to private car by wheelchair.

## 2020-08-23 NOTE — NUR
NURSE HAND-OFF: 



Important Events on Shift:

Patient Status: Stable

Diet: Regular diet



Pending Orders: 

Pending Results/Labs:

Pending MD notification:



Latest Vital Signs: Temperature 97.5 , Pulse 82 , B/P 100 /58 , Respiratory Rate 18 , O2 SAT 
98 , Room Air, O2 Flow Rate  .  

Vital Sign Comment: 



Latest Mae Fall Score: 35  

Fall Risk: Medium Risk 

Safety Measures: Call light Within Reach, Bed Alarm Zone 1, Side Rails Side Rails x2, Bed 
position Low and Locked.

Fall Precautions: 

Yellow Socks

Yellow Gown

Door Sign

Patient Fall Education



Report given to MAZIN Corbin.

## 2020-08-23 NOTE — NUR
NURSE NOTES:

Patient lying in bed awake. Complain of pain 7/10 on surgical site and will administer pain 
medication as ordered. IV dressing intact and dry. Surgical dressing intact and dry. Bed 
lowest position. Call light within reach. Will continue to monitor.

## 2020-08-24 NOTE — DISCHARGE SUMMARY
Discharge Summary


Hospital Course


Date of Admission


Aug 21, 2020 at 07:01


Date of Discharge


Aug 23, 2020 at 14:06


Admitting Diagnosis





 Intractable back and leg pain, Herniated nucleus pulposus L4-5, L5-S1, 

radiculopathy.


Reason for Hospitalization:  Elective surgery


HPI


Marcos Aguirre is a 39 year old male who was admitted on Aug 21, 2020 at 07:01 

for Herniated Nucleus Pulposus, Pain, Radiculopathy.


The patient tried and failed a prolonged course of conservative management, 

including but not limited to chiropractic


therapy, physical therapy, nonsteroidal anti-inflammatory drugs, medication, 

ice packs as well as epidural injection. 


Despite these therapies, the patient still developed recalcitrant pain and 

elected for definitive management in the form 


of right-sided L4-L5, [id number], L4-L5, L5-S1 hemilaminotomy, 

foraminotomy and medial facetectomy, L4-L5, 


L5-S1 neural foraminotomy through a transpedicular intraforaminal approach. 


Patient  consented for surgery .


Procedures





s/p 8/21/20 by Dr Armstrong


1. Right-sided L4-L5, L5-S1 microdiscectomy.


2. L4-L5, L5-S1 hemilaminotomy, foraminotomy and medial facetectomy.


3. L4-L5, L5-S1 neural foraminotomy through a transpedicular


intraforaminal approach.


4. Use of intraoperative microscope.


5. Supervision and interpretation of intraoperative fluoroscopy.


6. Supervision and interpretation of somatosensory-evoked potential and


free running EMG monitoring.


7. Bilateral hemilaminotomy, foraminotomy L5-S1 and L4-L5.


8. Duraplasty L4-L5.


Hospital Course


status post surgery 


 


initially IV fluids


s/p perioperative antibiotics


neurovascular status  closely monitored,   remained stable 


incision clean ,dry and intact 


pain management  was addressed  and controlled 


remained hemodynamically stable 


ambulated with PT  


fall precautions maintained;  safe for ambulation 


DVT prophylaxis provided 


use of incentive spirometry was  encouraged while in the bed 


tolerated diet , IV fluids discontinued 


GI prophylaxis provided 


antiemetics were on board as needed 


voided freely


bowel regimen instituted 


patient was stable for discharge 


discharge instructions provided 


follow up with surgeon in the office as advised 








FINAL DIAGNOSES


1. Intractable back pain.


2. Intractable leg pain.


3. Worsening radiculopathy.


4. Weakness.


5. Herniated nucleus pulposus, L4-L5, L5-S1 herniation.


6. Neural foraminal stenosis, L4-L5, L5-S1.


7. s/p Bilateral hemilaminotomy foraminotomy and right sided microdiscectomy of 

lumbar 45 and 5-S1





Discharge Medications


Continued Medications:  


Hydrocodone Bit/Acetaminophen * (Norco *) 1 Each Tablet


1 TAB ORAL Q8HR PRN for For Pain, #90 TAB 0 Refills (This prescription has been 

renewed)


PRN PAIN








Discharge


Condition Upon Discharge:  stable


Discharge Vital Signs





Last Vital Signs








  Date Time  Temp Pulse Resp B/P (MAP) Pulse Ox O2 Delivery O2 Flow Rate FiO2


 


8/23/20 12:00 98.0 86 16 111/62 (78) 100   


 


8/23/20 09:00      Room Air  


 


8/21/20 15:50       6 








Discharge Disposition


Patient was discharged to Home (01)





Discharge Instructions


Discharge Instructions


Special Instructions


I have been assigned to complete a D/C Summary on this account. I was not 

involved in the patient management











Kiley Hollingsworth NP Aug 24, 2020 08:52

## 2023-06-01 NOTE — NUR
NURSE NOTES:

Spoke to  regarding discharge and cleared to discharge surgical standpoint. Order 
noted and carried out. No